# Patient Record
Sex: FEMALE | Race: WHITE
[De-identification: names, ages, dates, MRNs, and addresses within clinical notes are randomized per-mention and may not be internally consistent; named-entity substitution may affect disease eponyms.]

---

## 2017-02-07 ENCOUNTER — HOSPITAL ENCOUNTER (OUTPATIENT)
Dept: HOSPITAL 45 - C.LAB | Age: 77
Discharge: HOME | End: 2017-02-07
Attending: INTERNAL MEDICINE
Payer: COMMERCIAL

## 2017-02-07 DIAGNOSIS — E78.00: ICD-10-CM

## 2017-02-07 DIAGNOSIS — R73.9: Primary | ICD-10-CM

## 2017-02-07 DIAGNOSIS — M85.80: ICD-10-CM

## 2017-02-07 LAB
ALBUMIN/GLOB SERPL: 1.2 {RATIO} (ref 0.9–2)
ALP SERPL-CCNC: 70 U/L (ref 45–117)
ALT SERPL-CCNC: 28 U/L (ref 12–78)
ANION GAP SERPL CALC-SCNC: 8 MMOL/L (ref 3–11)
AST SERPL-CCNC: 22 U/L (ref 15–37)
BUN SERPL-MCNC: 26 MG/DL (ref 7–18)
BUN/CREAT SERPL: 35 (ref 10–20)
CALCIUM SERPL-MCNC: 8.8 MG/DL (ref 8.5–10.1)
CHLORIDE SERPL-SCNC: 107 MMOL/L (ref 98–107)
CHOLEST/HDLC SERPL: 2.3 {RATIO}
CO2 SERPL-SCNC: 28 MMOL/L (ref 21–32)
CREAT SERPL-MCNC: 0.74 MG/DL (ref 0.6–1.2)
EST. AVERAGE GLUCOSE BLD GHB EST-MCNC: 123 MG/DL
GLOBULIN SER-MCNC: 3.2 GM/DL (ref 2.5–4)
GLUCOSE SERPL-MCNC: 84 MG/DL (ref 70–99)
GLUCOSE UR QL: 58 MG/DL
KETONES UR QL STRIP: 60 MG/DL
NITRITE UR QL STRIP: 64 MG/DL (ref 0–150)
PH UR: 131 MG/DL (ref 0–200)
POTASSIUM SERPL-SCNC: 3.9 MMOL/L (ref 3.5–5.1)
SODIUM SERPL-SCNC: 143 MMOL/L (ref 136–145)
VERY LOW DENSITY LIPOPROT CALC: 13 MG/DL

## 2017-02-09 ENCOUNTER — HOSPITAL ENCOUNTER (OUTPATIENT)
Dept: HOSPITAL 45 - C.LAB1850 | Age: 77
Discharge: HOME | End: 2017-02-09
Attending: INTERNAL MEDICINE
Payer: COMMERCIAL

## 2017-02-09 DIAGNOSIS — E78.00: Primary | ICD-10-CM

## 2017-07-22 ENCOUNTER — HOSPITAL ENCOUNTER (OUTPATIENT)
Dept: HOSPITAL 45 - C.LAB | Age: 77
Discharge: HOME | End: 2017-07-22
Attending: INTERNAL MEDICINE
Payer: COMMERCIAL

## 2017-07-22 DIAGNOSIS — E78.00: Primary | ICD-10-CM

## 2017-07-22 DIAGNOSIS — R73.03: ICD-10-CM

## 2017-07-22 LAB
ALT SERPL-CCNC: 33 U/L (ref 12–78)
ANION GAP SERPL CALC-SCNC: 3 MMOL/L (ref 3–11)
AST SERPL-CCNC: 18 U/L (ref 15–37)
BUN SERPL-MCNC: 23 MG/DL (ref 7–18)
BUN/CREAT SERPL: 29.8 (ref 10–20)
CALCIUM SERPL-MCNC: 8.9 MG/DL (ref 8.5–10.1)
CHLORIDE SERPL-SCNC: 109 MMOL/L (ref 98–107)
CHOLEST/HDLC SERPL: 2.3 {RATIO}
CO2 SERPL-SCNC: 32 MMOL/L (ref 21–32)
CREAT SERPL-MCNC: 0.78 MG/DL (ref 0.6–1.2)
EST. AVERAGE GLUCOSE BLD GHB EST-MCNC: 120 MG/DL
GLUCOSE SERPL-MCNC: 93 MG/DL (ref 70–99)
GLUCOSE UR QL: 55 MG/DL
KETONES UR QL STRIP: 61 MG/DL
NITRITE UR QL STRIP: 56 MG/DL (ref 0–150)
PH UR: 127 MG/DL (ref 0–200)
POTASSIUM SERPL-SCNC: 3.8 MMOL/L (ref 3.5–5.1)
SODIUM SERPL-SCNC: 144 MMOL/L (ref 136–145)
VERY LOW DENSITY LIPOPROT CALC: 11 MG/DL

## 2017-07-24 ENCOUNTER — HOSPITAL ENCOUNTER (OUTPATIENT)
Dept: HOSPITAL 45 - C.MAMM | Age: 77
Discharge: HOME | End: 2017-07-24
Attending: INTERNAL MEDICINE
Payer: COMMERCIAL

## 2017-07-24 DIAGNOSIS — Z12.31: Primary | ICD-10-CM

## 2017-07-25 NOTE — MAMMOGRAPHY REPORT
BILATERAL DIGITAL SCREENING MAMMOGRAM WITH CAD: 7/24/2017

CLINICAL HISTORY: Routine screening.  





TECHNIQUE:  Current study was also evaluated with a Computer Aided Detection (CAD) system.  Bilateral
 CC and MLO views were obtained.



COMPARISON: Comparison is made to exams dated:  7/20/2016 mammogram, 12/2/2014 mammogram, 7/1/2013 ma
mmogram, 6/19/2012 mammogram - [facility], and 2/25/2009.   



BREAST COMPOSITION:  There are scattered areas of fibroglandular density in both breasts.  



FINDINGS:  No suspicious masses, calcifications, or areas of architectural distortion are noted in ei
ther breast. There has been no significant interval change compared to prior exams. Scattered bilater
al benign-appearing calcifications are not significantly changed.  Focal asymmetry in the right 6:00 
breast is stable dating back to at least the 2012 exam.



IMPRESSION:  ACR BI-RADS CATEGORY 2: BENIGN

There is no mammographic evidence of malignancy. A 1 year screening mammogram is recommended.  The pa
tient will receive written notification of the results.  





Approximately 10% of breast cancers are not detected with mammography. A negative mammographic report
 should not delay biopsy if a clinically suggestive mass is present.



Madison Zabala M.D.          

/:7/24/2017 16:37:31  



Imaging Technologist: Jose ANDERSON(SUSAN)(GARO), [facility]

letter sent: Normal 1/2  

BI-RADS Code: ACR BI-RADS Category 2: Benign

## 2017-08-25 ENCOUNTER — HOSPITAL ENCOUNTER (OUTPATIENT)
Dept: HOSPITAL 45 - C.RADBC | Age: 77
Discharge: HOME | End: 2017-08-25
Attending: PHYSICIAN ASSISTANT
Payer: COMMERCIAL

## 2017-08-25 DIAGNOSIS — R05: Primary | ICD-10-CM

## 2018-01-10 ENCOUNTER — HOSPITAL ENCOUNTER (OUTPATIENT)
Dept: HOSPITAL 45 - C.LABSPEC | Age: 78
Discharge: HOME | End: 2018-01-10
Attending: DERMATOLOGY
Payer: COMMERCIAL

## 2018-01-10 DIAGNOSIS — L30.9: Primary | ICD-10-CM

## 2018-01-26 ENCOUNTER — HOSPITAL ENCOUNTER (OUTPATIENT)
Dept: HOSPITAL 45 - C.LAB | Age: 78
Discharge: HOME | End: 2018-01-26
Attending: DERMATOLOGY
Payer: COMMERCIAL

## 2018-01-26 DIAGNOSIS — L65.9: ICD-10-CM

## 2018-01-26 DIAGNOSIS — R73.03: ICD-10-CM

## 2018-01-26 DIAGNOSIS — E78.00: Primary | ICD-10-CM

## 2018-01-26 LAB
ALT SERPL-CCNC: 33 U/L (ref 12–78)
AST SERPL-CCNC: 22 U/L (ref 15–37)
BASOPHILS # BLD: 0.03 K/UL (ref 0–0.2)
BASOPHILS NFR BLD: 0.4 %
BUN SERPL-MCNC: 25 MG/DL (ref 7–18)
CALCIUM SERPL-MCNC: 9.4 MG/DL (ref 8.5–10.1)
CO2 SERPL-SCNC: 28 MMOL/L (ref 21–32)
CREAT SERPL-MCNC: 0.78 MG/DL (ref 0.6–1.2)
EOS ABS #: 0.15 K/UL (ref 0–0.5)
EOSINOPHIL NFR BLD AUTO: 283 K/UL (ref 130–400)
GLUCOSE SERPL-MCNC: 87 MG/DL (ref 70–99)
HBA1C MFR BLD: 5.9 % (ref 4.5–5.6)
HCT VFR BLD CALC: 40.7 % (ref 37–47)
HGB BLD-MCNC: 13.9 G/DL (ref 12–16)
IG#: 0.01 K/UL (ref 0–0.02)
IMM GRANULOCYTES NFR BLD AUTO: 25.5 %
KETONES UR QL STRIP: 66 MG/DL
LYMPHOCYTES # BLD: 1.8 K/UL (ref 1.2–3.4)
MCH RBC QN AUTO: 31.4 PG (ref 25–34)
MCHC RBC AUTO-ENTMCNC: 34.2 G/DL (ref 32–36)
MCV RBC AUTO: 91.9 FL (ref 80–100)
MONO ABS #: 0.69 K/UL (ref 0.11–0.59)
MONOCYTES NFR BLD: 9.8 %
NEUT ABS #: 4.37 K/UL (ref 1.4–6.5)
NEUTROPHILS # BLD AUTO: 2.1 %
NEUTROPHILS NFR BLD AUTO: 62.1 %
PH UR: 137 MG/DL (ref 0–200)
PMV BLD AUTO: 10.8 FL (ref 7.4–10.4)
POTASSIUM SERPL-SCNC: 3.7 MMOL/L (ref 3.5–5.1)
RED CELL DISTRIBUTION WIDTH CV: 13.1 % (ref 11.5–14.5)
RED CELL DISTRIBUTION WIDTH SD: 43.9 FL (ref 36.4–46.3)
SODIUM SERPL-SCNC: 140 MMOL/L (ref 136–145)
WBC # BLD AUTO: 7.05 K/UL (ref 4.8–10.8)

## 2018-01-30 LAB
ANA SCREEN  TC 249X: NEGATIVE
TESTOST SERPL-MCNC: 19 NG/DL (ref 2–45)

## 2018-08-15 ENCOUNTER — HOSPITAL ENCOUNTER (OUTPATIENT)
Dept: HOSPITAL 45 - C.MAMM | Age: 78
Discharge: HOME | End: 2018-08-15
Attending: INTERNAL MEDICINE
Payer: COMMERCIAL

## 2018-08-15 DIAGNOSIS — Z12.31: Primary | ICD-10-CM

## 2018-08-16 NOTE — MAMMOGRAPHY REPORT
BILATERAL DIGITAL SCREENING MAMMOGRAM TOMOSYNTHESIS WITH CAD: 8/15/2018

CLINICAL HISTORY: Routine screening. Patient has no complaints.  





TECHNIQUE: The study was acquired using full field digital technology and interpreted from soft copy.
 Breast tomosynthesis in addition to standard 2D mammography was performed. Current study was also ev
aluated with a Computer Aided Detection (CAD) system.  



COMPARISON: Comparison is made to exams dated:  7/24/2017 mammogram, 7/20/2016 mammogram, 12/2/2014 m
ammogram, 7/1/2013 mammogram, 6/19/2012 mammogram - [facility], and 2/25/2009.   

BREAST COMPOSITION: There are scattered areas of fibroglandular density in both breasts.  



FINDINGS: 

The parenchymal pattern is unchanged. No developing mass, architectural distortion or cluster of susp
icious microcalcifications is seen in either breast.  





IMPRESSION: ACR BI-RADS CATEGORY 2: BENIGN

There is no mammographic evidence of malignancy. A 1 year screening mammogram is recommended.(08/16/2
019)  The patient will receive written notification of the results.  





Some breast cancers are not detected with mammography. A negative mammographic report should not evin
y biopsy if a clinically suggestive mass is present.



Melina Rico M.D.          

ay/:8/15/2018 16:29:37  



Imaging Technologist: RT Marco(SUSAN)(M)(BD), [facility]

letter sent: Normal 1/2  

BI-RADS Code: ACR BI-RADS Category 2: Benign
